# Patient Record
Sex: MALE | Race: OTHER | Employment: FULL TIME | URBAN - METROPOLITAN AREA
[De-identification: names, ages, dates, MRNs, and addresses within clinical notes are randomized per-mention and may not be internally consistent; named-entity substitution may affect disease eponyms.]

---

## 2021-12-22 ENCOUNTER — HOSPITAL ENCOUNTER (EMERGENCY)
Age: 41
Discharge: HOME | End: 2021-12-23

## 2021-12-22 VITALS
DIASTOLIC BLOOD PRESSURE: 98 MMHG | RESPIRATION RATE: 16 BRPM | HEART RATE: 88 BPM | SYSTOLIC BLOOD PRESSURE: 128 MMHG | OXYGEN SATURATION: 98 % | TEMPERATURE: 98.2 F

## 2021-12-22 DIAGNOSIS — S43.005A DISLOCATION OF LEFT SHOULDER JOINT, INITIAL ENCOUNTER: Primary | ICD-10-CM

## 2021-12-22 PROCEDURE — 23650 CLTX SHO DSLC W/MNPJ WO ANES: CPT

## 2021-12-22 PROCEDURE — 99282 EMERGENCY DEPT VISIT SF MDM: CPT

## 2021-12-22 PROCEDURE — 96372 THER/PROPH/DIAG INJ SC/IM: CPT

## 2021-12-22 RX ORDER — KETOROLAC TROMETHAMINE 30 MG/ML
30 INJECTION, SOLUTION INTRAMUSCULAR; INTRAVENOUS ONCE
Status: COMPLETED | OUTPATIENT
Start: 2021-12-22 | End: 2021-12-23

## 2021-12-22 ASSESSMENT — PAIN SCALES - GENERAL: PAINLEVEL_OUTOF10: 9

## 2021-12-23 ENCOUNTER — APPOINTMENT (OUTPATIENT)
Dept: GENERAL RADIOLOGY | Age: 41
End: 2021-12-23

## 2021-12-23 PROCEDURE — 73030 X-RAY EXAM OF SHOULDER: CPT

## 2021-12-23 PROCEDURE — 6360000002 HC RX W HCPCS: Performed by: PHYSICIAN ASSISTANT

## 2021-12-23 RX ADMIN — KETOROLAC TROMETHAMINE 30 MG: 30 INJECTION, SOLUTION INTRAMUSCULAR; INTRAVENOUS at 00:00

## 2021-12-23 ASSESSMENT — ENCOUNTER SYMPTOMS
VOMITING: 0
NAUSEA: 0

## 2021-12-23 ASSESSMENT — PAIN SCALES - GENERAL: PAINLEVEL_OUTOF10: 10

## 2021-12-23 NOTE — ED PROVIDER NOTES
[address] [facility]        Pt Name: Inocente Lazaro  MRN: [id number]  Aristeogfshirin 1980  Date of evaluation: 12/22/2021  Provider: Jailene Espana PA-C  PCP: No primary care provider on file. Note Started: 1:52 AM EST       MARTINEZ. I have evaluated this patient. My supervising physician was available for consultation. CHIEF COMPLAINT       Chief Complaint   Patient presents with    Shoulder Injury     Patient in with complaints that his left shoulder was dislocated today. States shoulder is still out of place and he is having pain        HISTORY OF PRESENT ILLNESS   (Location, Timing/Onset, Context/Setting, Quality, Duration, Modifying Factors, Severity, Associated Signs and Symptoms)  Note limiting factors. Chief Complaint: Left shoulder pain    Gibran Katrin Kimball is a 46 y.o. male who presents the emergency department today for evaluation for left shoulder pain. The patient states that he was working, and he actually denies falling on the shoulder but he states he felt a \"pop\" of the shoulder, and he feels it is out of place. Patient states he has never dislocated his shoulder in the past.  The patient states his pain is sharp, constant and scale he rated as a 10/10. He states that his pain is worse with touch and certain movements. He is reporting a \"tingling\" sensation in his arm but denies any true numbness. He has no fever or chills. No nausea or vomiting. No other complaints    Nursing Notes were all reviewed and agreed with or any disagreements were addressed in the HPI. REVIEW OF SYSTEMS    (2-9 systems for level 4, 10 or more for level 5)     Review of Systems   Constitutional: Negative for activity change, appetite change and fever. Gastrointestinal: Negative for nausea and vomiting. Musculoskeletal: Positive for arthralgias. Skin: Negative for wound. Neurological: Negative for weakness and numbness.        Positives and Pertinent negatives as per HPI. Except as noted above in the ROS, all other systems were reviewed and negative. PAST MEDICAL HISTORY   No past medical history on file. SURGICAL HISTORY   No past surgical history on file. CURRENTMEDICATIONS     There are no discharge medications for this patient. ALLERGIES     Patient has no known allergies. FAMILYHISTORY     No family history on file. SOCIAL HISTORY       Social History     Tobacco Use    Smoking status: Never Smoker    Smokeless tobacco: Never Used   Substance Use Topics    Alcohol use: Never    Drug use: Never       SCREENINGS             PHYSICAL EXAM    (up to 7 for level 4, 8 or more for level 5)     ED Triage Vitals [12/22/21 2324]   BP Temp Temp Source Pulse Resp SpO2 Height Weight   (!) 128/98 98.2 °F (36.8 °C) Oral 88 16 98 % -- --       Physical Exam  Vitals and nursing note reviewed. Constitutional:       Appearance: He is well-developed. He is not diaphoretic. HENT:      Head: Normocephalic and atraumatic. Right Ear: External ear normal.      Left Ear: External ear normal.      Nose: Nose normal.   Eyes:      General:         Right eye: No discharge. Left eye: No discharge. Neck:      Trachea: No tracheal deviation. Cardiovascular:      Pulses: Normal pulses. Pulmonary:      Effort: Pulmonary effort is normal. No respiratory distress. Musculoskeletal:         General: Normal range of motion. Cervical back: Normal range of motion and neck supple. Comments: There is loss of contour of the shoulder on the left. Limited range of motion secondary to pain. Radial pulses 2+. Normal sensation light touch. Neurovascular intact. Wiggles all fingers. Skin:     General: Skin is warm and dry. Neurological:      Mental Status: He is alert and oriented to person, place, and time.    Psychiatric:         Behavior: Behavior normal.         DIAGNOSTIC RESULTS   LABS:    Labs Reviewed - No data to display    When ordered only abnormal lab results are displayed. All other labs were within normal range or not returned as of this dictation. EKG: When ordered, EKG's are interpreted by the Emergency Department Physician in the absence of a cardiologist.  Please see their note for interpretation of EKG. RADIOLOGY:   Non-plain film images such as CT, Ultrasound and MRI are read by the radiologist. Plain radiographic images are visualized and preliminarily interpreted by the ED Provider with the below findings:        Interpretation per the Radiologist below, if available at the time of this note:    XR SHOULDER LEFT (MIN 2 VIEWS)   Final Result   The left glenohumeral joint is now in anatomic alignment. XR SHOULDER LEFT (MIN 2 VIEWS)   Final Result   Acute anterior inferior dislocation of the left glenohumeral joint. XR SHOULDER LEFT (MIN 2 VIEWS)    Result Date: 12/23/2021  EXAMINATION: THREE XRAY VIEWS OF THE LEFT SHOULDER 12/23/2021 12:41 am COMPARISON: None. HISTORY: ORDERING SYSTEM PROVIDED HISTORY: post redcution TECHNOLOGIST PROVIDED HISTORY: Reason for exam:->post redcution Reason for Exam: post reduction FINDINGS: The left glenohumeral joint is now in anatomic alignment. No acute fracture is visualized. The left glenohumeral joint is now in anatomic alignment. XR SHOULDER LEFT (MIN 2 VIEWS)    Result Date: 12/23/2021  EXAMINATION: THREE XRAY VIEWS OF THE LEFT SHOULDER 12/23/2021 12:19 am COMPARISON: None. HISTORY: ORDERING SYSTEM PROVIDED HISTORY: pain TECHNOLOGIST PROVIDED HISTORY: Reason for exam:->pain Reason for Exam: L/shoulder pain FINDINGS: Acute anterior inferior dislocation of the left glenohumeral joint. The left [facility] joint is in anatomic alignment. No acute fracture is visualized. The visualized lung field is clear. Acute anterior inferior dislocation of the left glenohumeral joint.            PROCEDURES   Unless otherwise noted below, none     Ortho Injury    Date/Time: 12/23/2021 1:54 AM  Performed by: Brent Sung PA-C  Authorized by: Brent Sung PA-C   Consent: Verbal consent obtained. Consent given by: patient  Patient understanding: patient states understanding of the procedure being performed  Patient consent: the patient's understanding of the procedure matches consent given  Required items: required blood products, implants, devices, and special equipment available  Patient identity confirmed: verbally with patient  Injury location: shoulder  Location details: left shoulder  Injury type: dislocation  Dislocation type: anterior  Hill-Sachs deformity: no  Chronicity: new  Pre-procedure neurovascular assessment: neurovascularly intact  Pre-procedure range of motion: reduced    Anesthesia:  Local anesthesia used: no    Sedation:  Patient sedated: no    Manipulation performed: yes  Reduction method: traction and counter traction  Reduction successful: yes  X-ray confirmed reduction: yes  Immobilization: sling  Post-procedure neurovascular assessment: post-procedure neurovascularly intact  Patient tolerance: patient tolerated the procedure well with no immediate complications          CRITICAL CARE TIME   N/A    CONSULTS:  None      EMERGENCY DEPARTMENT COURSE and DIFFERENTIAL DIAGNOSIS/MDM:   Vitals:    Vitals:    12/22/21 2324   BP: (!) 128/98   Pulse: 88   Resp: 16   Temp: 98.2 °F (36.8 °C)   TempSrc: Oral   SpO2: 98%       Patient was given the following medications:  Medications   ketorolac (TORADOL) injection 30 mg (30 mg IntraMUSCular Given 12/23/21 0000)           Briefly, this is a 72-year-old male who presents emergency department today for evaluation for left shoulder pain. Patient states that he is unsure exactly what happened, he states that he was at work today, he states that he felt his shoulder pop out of place.   The patient states that he feels that his shoulder is dislocated although he states that this is never happened to him in the past.    On examination, the patient has loss of the anterior shoulder. He is neurovascular intact. X-ray does show an anterior acute dislocation. The area was manipulated by myself, we did not use any sedation medications, patient was medicated with Toradol, tolerated the procedure quite well. X-rays confirmed reduction    Patient will be placed in sling and swath, he was referred to orthopedics for follow-up within the next week. He is to return to the ED for any new or worsening symptoms, the patient was understanding scribbled plan. Stable for discharge. No results found for this visit on 12/22/21. I estimate there is LOW risk for COMPARTMENT SYNDROME, DEEP VENOUS THROMBOSIS, SEPTIC ARTHRITIS, TENDON OR NEUROVASCULAR INJURY, thus I consider the discharge disposition reasonable. Gibran Martinez and I have discussed the diagnosis and risks, and we agree with discharging home to follow-up with their primary doctor or the referral orthopedist. We also discussed returning to the Emergency Department immediately if new or worsening symptoms occur. We have discussed the symptoms which are most concerning (e.g., changing or worsening pain, numbness, weakness) that necessitate immediate return. Final Impression    1. Dislocation of left shoulder joint, initial encounter        Blood pressure (!) 128/98, pulse 88, temperature 98.2 °F (36.8 °C), temperature source Oral, resp. rate 16, SpO2 98 %. FINAL IMPRESSION      1. Dislocation of left shoulder joint, initial encounter          DISPOSITION/PLAN   DISPOSITION Decision To Discharge 12/23/2021 01:04:28 AM      PATIENT REFERRED TO:  Chapito Otero MD  [address] [place] [address].   [place] [id number]    Schedule an appointment as soon as possible for a visit in 1 week      [facility] Emergency Department  [address] [place] [address]  [phone]    As needed, If symptoms worsen      DISCHARGE MEDICATIONS:  There are no discharge medications for this patient. DISCONTINUED MEDICATIONS:  There are no discharge medications for this patient.              (Please note that portions of this note were completed with a voice recognition program.  Efforts were made to edit the dictations but occasionally words are mis-transcribed.)    Kinjal Castillo PA-C (electronically signed)            Kinjal Castillo PA-C  12/23/21 0155

## 2021-12-23 NOTE — ED NOTES
Sling placed on patient Pt Discharged in stable condition, VSS, no signs of distress, discharge instructions and meds reviewed. Pt verbalizes understanding and states no further questions or concerns unaddressed.        Ismael Taylor RN  12/23/21 [id number]

## 2022-07-04 ENCOUNTER — APPOINTMENT (OUTPATIENT)
Dept: CT IMAGING | Age: 42
End: 2022-07-04

## 2022-07-04 ENCOUNTER — APPOINTMENT (OUTPATIENT)
Dept: GENERAL RADIOLOGY | Age: 42
End: 2022-07-04

## 2022-07-04 ENCOUNTER — HOSPITAL ENCOUNTER (EMERGENCY)
Age: 42
Discharge: HOME | End: 2022-07-04
Attending: EMERGENCY MEDICINE

## 2022-07-04 VITALS
OXYGEN SATURATION: 99 % | DIASTOLIC BLOOD PRESSURE: 71 MMHG | HEART RATE: 86 BPM | SYSTOLIC BLOOD PRESSURE: 106 MMHG | RESPIRATION RATE: 13 BRPM | TEMPERATURE: 99.1 F
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
## 2022-07-04 DIAGNOSIS — S43.015A ANTERIOR DISLOCATION OF LEFT SHOULDER, INITIAL ENCOUNTER: ICD-10-CM

## 2022-07-04 PROCEDURE — 99285 EMERGENCY DEPT VISIT HI MDM: CPT

## 2022-07-04 PROCEDURE — 2580000003 HC RX 258: Performed by: EMERGENCY MEDICINE

## 2022-07-04 PROCEDURE — 99152 MOD SED SAME PHYS/QHP 5/>YRS: CPT

## 2022-07-04 PROCEDURE — 96361 HYDRATE IV INFUSION ADD-ON: CPT

## 2022-07-04 PROCEDURE — 72125 CT NECK SPINE W/O DYE: CPT

## 2022-07-04 PROCEDURE — 96375 TX/PRO/DX INJ NEW DRUG ADDON: CPT

## 2022-07-04 PROCEDURE — 73030 X-RAY EXAM OF SHOULDER: CPT

## 2022-07-04 PROCEDURE — 71045 X-RAY EXAM CHEST 1 VIEW: CPT

## 2022-07-04 PROCEDURE — 6360000002 HC RX W HCPCS: Performed by: EMERGENCY MEDICINE

## 2022-07-04 PROCEDURE — 96374 THER/PROPH/DIAG INJ IV PUSH: CPT

## 2022-07-04 PROCEDURE — 23650 CLTX SHO DSLC W/MNPJ WO ANES: CPT

## 2022-07-04 PROCEDURE — 70450 CT HEAD/BRAIN W/O DYE: CPT

## 2022-07-04 RX ORDER — ONDANSETRON 2 MG/ML
4 INJECTION INTRAMUSCULAR; INTRAVENOUS
Status: DISCONTINUED | OUTPATIENT
Start: 2022-07-04 | End: 2022-07-04 | Stop reason: HOSPADM

## 2022-07-04 RX ORDER — FENTANYL CITRATE 50 UG/ML
50 INJECTION, SOLUTION INTRAMUSCULAR; INTRAVENOUS ONCE
Status: COMPLETED | OUTPATIENT
Start: 2022-07-04 | End: 2022-07-04

## 2022-07-04 RX ORDER — IBUPROFEN 200 MG
600 TABLET ORAL EVERY 8 HOURS PRN
Qty: 60 TABLET | Refills: 0 | Status: SHIPPED | OUTPATIENT
Start: 2022-07-04

## 2022-07-04 RX ORDER — SODIUM CHLORIDE 9 MG/ML
INJECTION, SOLUTION INTRAVENOUS CONTINUOUS
Status: DISCONTINUED | OUTPATIENT
Start: 2022-07-04 | End: 2022-07-04 | Stop reason: HOSPADM

## 2022-07-04 RX ORDER — PROPOFOL 10 MG/ML
INJECTION, EMULSION INTRAVENOUS
Status: DISCONTINUED
Start: 2022-07-04 | End: 2022-07-04 | Stop reason: HOSPADM

## 2022-07-04 RX ORDER — CYCLOBENZAPRINE HCL 10 MG
10 TABLET ORAL 3 TIMES DAILY PRN
Qty: 20 TABLET | Refills: 0 | Status: SHIPPED | OUTPATIENT
Start: 2022-07-04 | End: 2022-07-14

## 2022-07-04 RX ADMIN — SODIUM CHLORIDE: 9 INJECTION, SOLUTION INTRAVENOUS at 07:03

## 2022-07-04 RX ADMIN — ONDANSETRON 4 MG: 2 INJECTION INTRAMUSCULAR; INTRAVENOUS at 07:00

## 2022-07-04 RX ADMIN — FENTANYL CITRATE 50 MCG: 50 INJECTION, SOLUTION INTRAMUSCULAR; INTRAVENOUS at 07:00

## 2022-07-04 ASSESSMENT — PAIN - FUNCTIONAL ASSESSMENT: PAIN_FUNCTIONAL_ASSESSMENT: 0-10

## 2022-07-04 ASSESSMENT — PAIN SCALES - GENERAL
PAINLEVEL_OUTOF10: 10
PAINLEVEL_OUTOF10: 10

## 2022-07-04 NOTE — ED NOTES
Patient to CT at this time     Delayne JUAN ANTONIO Mcconnell  07/04/22 7539
Pt back from CT at this time     Genesis Díaz, JUAN ANTONIO  07/04/22 9479
Safety supplies at bedside pre sedation, including suction equipment, bag-mask with valve, oxygen equipment/non-rebreather masks, CO2 monitor on patient.      Flonnie Kussmaul, RN  07/04/22 7002
Detail Level: Simple
1 = assistive equipment
0 = independent

## 2022-07-10 NOTE — ED PROVIDER NOTES
[facility]) Emergency [address] [facility]    Tiara Ace MD, am the primary clinician of record. CHIEF COMPLAINT  Chief Complaint   Patient presents with    Shoulder Pain     Patient triaged using , brought in by [facility]. Per EMS patient was found on the side of the road. Patient states he fell and dislocated his L shoulder also states he has been drinking tonight. Denies LOC or head trauma. Patient states this happens frequently. HISTORY OF PRESENT ILLNESS  Gibran Greg Dai is a 46 y.o. male who presents to the ED complaining of drinking alcohol tonight and sustaining a fall. He suspects he dislocated his left shoulder. This has happened to him previously. Occurred roughly 2 hours prior to arrival today. This happened on his doorstep and then he went outside and was found and brought here. Denies any head injury, neck pain, or other injuries. R hand dominant.  phone was used ([place] to [place]) in order to obtain history, assist with physical exam, explain results and medical decision making, plan for treatment/follow-up, and answer all questions and concerns.  #[id number] was used. No other complaints, modifying factors or associated symptoms. Nursing notes reviewed. History reviewed. No pertinent past medical history. History reviewed. No pertinent surgical history. History reviewed. No pertinent family history.   Social History     Socioeconomic History    Marital status: Single     Spouse name: Not on file    Number of children: Not on file    Years of education: Not on file    Highest education level: Not on file   Occupational History    Not on file   Tobacco Use    Smoking status: Never Smoker    Smokeless tobacco: Never Used   Substance and Sexual Activity    Alcohol use: Never    Drug use: Never    Sexual activity: Not on file   Other Topics Concern    Not on file   Social History Narrative    Not on file     Social Continue BRAT (broth, bananas, rice, apples, and toast or crackers) for the next 3-5 days  Continue small meals (5 meals) instead of 3 large meals for the next 3-5 days  Follow-up with PCP in 3-5 days  Report to the emergency department if symptoms recur or worsen  Determinants of Health     Financial Resource Strain:     Difficulty of Paying Living Expenses: Not on file   Food Insecurity:     Worried About Running Out of Food in the Last Year: Not on file    Vinnie of Food in the Last Year: Not on file   Transportation Needs:     Lack of Transportation (Medical): Not on file    Lack of Transportation (Non-Medical):  Not on file   Physical Activity:     Days of Exercise per Week: Not on file    Minutes of Exercise per Session: Not on file   Stress:     Feeling of Stress : Not on file   Social Connections:     Frequency of Communication with Friends and Family: Not on file    Frequency of Social Gatherings with Friends and Family: Not on file    Attends Rastafarian Services: Not on file    Active Member of [address] [place] [address] [employer] or Organizations: Not on file    Attends Club or Organization Meetings: Not on file    Marital Status: Not on file   Intimate Partner Violence:     Fear of Current or Ex-Partner: Not on file    Emotionally Abused: Not on file    Physically Abused: Not on file    Sexually Abused: Not on file   Housing Stability:     Unable to Pay for Housing in the Last Year: Not on file    Number of [place] in the Last Year: Not on file    Unstable Housing in the Last Year: Not on file     Current Facility-Administered Medications   Medication Dose Route Frequency Provider Last Rate Last Admin    propofol 200 MG/20ML injection             ondansetron (ZOFRAN) injection 4 mg  4 mg IntraVENous Q1H PRN Catheryn Collet, MD   4 mg at 07/04/22 0700    0.9 % sodium chloride infusion   IntraVENous Continuous Catheryn Collet,  mL/hr at 07/04/22 0703 New Bag at 07/04/22 0703     Current Outpatient Medications   Medication Sig Dispense Refill    ibuprofen (ADVIL) 200 MG tablet Take 3 tablets by mouth every 8 hours as needed for Pain 60 tablet 0    cyclobenzaprine (FLEXERIL) 10 MG tablet Take 1 tablet by mouth 3 times daily as needed for Muscle spasms (CAUTION: Can cause dizziness, don't drive while taking.) 20 tablet 0     No Known Allergies    REVIEW OF SYSTEMS  6 systems reviewed, pertinent positives per HPI otherwise noted to be negative    PHYSICAL EXAM   /71   Pulse 86   Temp 99.1 °F (37.3 °C) (Oral)   Resp 13   SpO2 99%    GENERAL APPEARANCE: Awake and alert. Cooperative. No acute distress. HEAD: Normocephalic. Atraumatic. EYES: PERRL. EOM's grossly intact. ENT: Mucous membranes are moist.   BACK:      Cervical: no tenderness noted, no midline tenderness, no paraspinous spasm      Thoracic: no tenderness noted, no midline tenderness, no paraspinous spasm      Lumbar: no tenderness noted, no midline tenderness, no paraspinous spasm;  NECK: Supple. Normal ROM. CHEST: Equal symmetric chest rise. RRR, no chest wall ttp  LUNGS: Breathing is unlabored. Speaking comfortably in full sentences. CTAB  ABDOMEN: Nondistended, nontender  MUSCULOSKELETAL:  RUE: No tenderness. 2+ radial pulse. Brisk cap refill x5 digits. Sensation and motor function fully intact in the radial, ulnar, and median nerve distribution. Full range of motion of all major joints. Cardinal movements of hand fully intact. No erythema, bruising, or lacerations. Comparments are soft. LUE: Shoulder with obvious anterior dislocation. No ttp at the elbow forearm wrist or hand. Tender at the shoulder joint. 2+ radial pulse. Brisk cap refill x5 digits. Sensation and motor function fully intact in the radial, ulnar, and median nerve distribution. Full range of motion of all major joints except the shoulder as limited by pain. Cardinal movements of hand fully intact. No erythema, bruising, or lacerations. Comparments are soft. RLE: No tenderness. 2+ DP and PT. Sensation and motor function fully intact. Full range of motion of all major joints. No erythema, bruising, or lacerations. Compartments are soft. 2+ patellar reflex. Achilles nontender and intact.   Able to bear weight. No joint swelling or effusions are noted. LLE: No tenderness. 2+ DP and PT. Sensation and motor function fully intact. Full range of motion of all major joints. No erythema, bruising, or lacerations. Compartments are soft. 2+ patellar reflex. Achilles nontender and intact. Able to bear weight. No joint swelling or effusions are noted. SKIN: Warm and dry. No acute rashes. NEUROLOGICAL: Alert and oriented. Strength is 5/5 in all extremities and sensation is intact. RADIOLOGY    CT HEAD WO CONTRAST    Result Date: 7/4/2022  EXAMINATION: CT OF THE CERVICAL SPINE WITHOUT CONTRAST; CT OF THE HEAD WITHOUT CONTRAST 7/4/2022 8:48 am TECHNIQUE: CT of the cervical spine was performed without the administration of intravenous contrast. Multiplanar reformatted images are provided for review. Automated exposure control, iterative reconstruction, and/or weight based adjustment of the mA/kV was utilized to reduce the radiation dose to as low as reasonably achievable.; CT of the head was performed without the administration of intravenous contrast. Automated exposure control, iterative reconstruction, and/or weight based adjustment of the mA/kV was utilized to reduce the radiation dose to as low as reasonably achievable. COMPARISON: None HISTORY: ORDERING SYSTEM PROVIDED HISTORY: trauma TECHNOLOGIST PROVIDED HISTORY: Reason for exam:->trauma Decision Support Exception - unselect if not a suspected or confirmed emergency medical condition->Emergency Medical Condition (MA) Reason for Exam: trauma FINDINGS: BONES/ALIGNMENT: There is normal alignment of the cervical spine. No fracture or osseous destructive lesion. DEGENERATIVE CHANGES: There is no significant degenerative disc disease or central spinal canal narrowing. SOFT TISSUES: No paraspinal masses. The lung apices are clear. CT head:  The cerebral and cerebellar parenchyma demonstrate normal volume without areas of hemorrhage, mass, or midline shift.  No abnormal extra-axial fluid collections. The ventricles are proportional to the cerebral sulci. Gray-white differentiation is maintained without evidence of an acute infarct. ORBITS: The orbits are unremarkable. SINUSES: There is scattered trace paranasal sinus disease. The mastoid air cells are well aerated. SOFT TISSUE/BONE: The calvarium is intact. No appreciable scalp soft tissue swelling. 1. No evidence of acute fracture in the cervical spine. 2. No acute intracranial abnormality. CT CERVICAL SPINE WO CONTRAST    Result Date: 7/4/2022  EXAMINATION: CT OF THE CERVICAL SPINE WITHOUT CONTRAST; CT OF THE HEAD WITHOUT CONTRAST 7/4/2022 8:48 am TECHNIQUE: CT of the cervical spine was performed without the administration of intravenous contrast. Multiplanar reformatted images are provided for review. Automated exposure control, iterative reconstruction, and/or weight based adjustment of the mA/kV was utilized to reduce the radiation dose to as low as reasonably achievable.; CT of the head was performed without the administration of intravenous contrast. Automated exposure control, iterative reconstruction, and/or weight based adjustment of the mA/kV was utilized to reduce the radiation dose to as low as reasonably achievable. COMPARISON: None HISTORY: ORDERING SYSTEM PROVIDED HISTORY: trauma TECHNOLOGIST PROVIDED HISTORY: Reason for exam:->trauma Decision Support Exception - unselect if not a suspected or confirmed emergency medical condition->Emergency Medical Condition (MA) Reason for Exam: trauma FINDINGS: BONES/ALIGNMENT: There is normal alignment of the cervical spine. No fracture or osseous destructive lesion. DEGENERATIVE CHANGES: There is no significant degenerative disc disease or central spinal canal narrowing. SOFT TISSUES: No paraspinal masses. The lung apices are clear. CT head:  The cerebral and cerebellar parenchyma demonstrate normal volume without areas of hemorrhage, mass, or midline shift. No abnormal extra-axial fluid collections. The ventricles are proportional to the cerebral sulci. Gray-white differentiation is maintained without evidence of an acute infarct. ORBITS: The orbits are unremarkable. SINUSES: There is scattered trace paranasal sinus disease. The mastoid air cells are well aerated. SOFT TISSUE/BONE: The calvarium is intact. No appreciable scalp soft tissue swelling. 1. No evidence of acute fracture in the cervical spine. 2. No acute intracranial abnormality. XR SHOULDER LEFT (MIN 2 VIEWS)    Result Date: 7/4/2022  EXAMINATION: THREE XRAY VIEWS OF THE LEFT SHOULDER 7/4/2022 8:15 am COMPARISON: 07/04/2022 at 7:17 a.m. HISTORY: Post reduction view after shoulder dislocation FINDINGS: Interval realignment of the humeral head; the glenohumeral joint is congruent. Mild widening of the acromioclavicular joint. No acute fracture. The soft tissues are unremarkable. Left glenohumeral joint is now in anatomic alignment. Mild widening of the acromioclavicular joint. No acute fracture. XR SHOULDER LEFT (MIN 2 VIEWS)    Result Date: 7/4/2022  EXAMINATION: THREE XRAY VIEWS OF THE LEFT SHOULDER 7/4/2022 6:55 am COMPARISON: 12/23/2021 HISTORY: ORDERING SYSTEM PROVIDED HISTORY: suspect dislocation TECHNOLOGIST PROVIDED HISTORY: Reason for exam:->suspect dislocation Reason for Exam: Suspected dislocation, Shoulder Pain (Patient triaged using , brought in by [facility]. Per EMS patient was found on the side of the road. Patient states he fell and dislocated his L shoulder also states he has been drinking tonight. Denies LOC or head trauma. Patient states this happens frequently. ). FINDINGS: Anterior left shoulder dislocation is noted. No fracture is evident. Bone density is normal.  Surrounding osseous and soft tissue structures are unremarkable. Anterior left shoulder dislocation.      XR CHEST PORTABLE    Result Date: 7/4/2022  EXAMINATION: ONE XRAY VIEW OF THE CHEST 7/4/2022 6:55 am COMPARISON: None. HISTORY: ORDERING SYSTEM PROVIDED HISTORY: trauma TECHNOLOGIST PROVIDED HISTORY: Reason for exam:->trauma Reason for Exam: Trauma, Shoulder Pain (Patient triaged using , brought in by [facility]. Per EMS patient was found on the side of the road. Patient states he fell and dislocated his L shoulder also states he has been drinking tonight. Denies LOC or head trauma. Patient states this happens frequently. ). FINDINGS: Heart size and pulmonary vasculature are normal.  The lungs are clear and normally expanded. Surrounding osseous and soft tissue structures are unremarkable. Normal examination. ED COURSE/MDM  Differential diagnosis considerations included: intracranial injury, cervical spine injury, chest/abdominal organ injury, extremity injury, abrasion/laceration, contusion, fracture, sprain/strain, dislocation    The patient's ED course was notable for mechanical fall while intoxicated with alcohol. Although his no head or neck injury given his intoxication we did perform a CT of the head and C-spine that were negative. His main complaint is left shoulder pain with an obvious anterior dislocation confirmed with x-ray without fracture. This was reduced after conscious sedation per the procedure note below. He is clinically conversant and ambulatory independently. He was placed in a sling and swath and referred to orthopedics for follow-up with prescriptions of NSAIDs and muscle relaxers. Joint Reduction Procedure Note    Indication: Joint dislocation    Consent: The patient was counseled regarding the procedure, it's indications, risks, potential complications and alternatives and any questions were answered. Consent was obtained. Procedure: The pre-reduction exam showed distal perfusion & neurologic function to be normal. The patient was placed in the sitting position.  Anesthesia/pain control was obtained using conscious sedation -SEE CONSCIOUS SEDATION NOTE FOR DETAILS. Reduction of the left shoulder was performed by downward traction, external rotation, then abduction, followed by axillary manipulation and counter traction. Post reduction films were obtained and revealed satisfactory reduction. A post-reduction exam revealed distal perfusion & neurologic function to be normal. The affected area was immobilized with a sling and swath. The patient tolerated the procedure well. Complications: None          Conscious Sedation Procedure Note    Indication: shoulder dislocation    Consent: I have discussed with the patient and/or the patient representative the indication, alternatives, and the possible risks and/or complications of the planned procedure and the anesthesia methods. The patient and/or patient representative appear to understand and agree to proceed. Physician Involvement: The attending physician was present and supervising this procedure. Pre-Sedation Documentation and Exam: I have personally completed a history, physical exam & review of systems for this patient (see notes). Lungs: clear to auscultation bilaterally, Cardiovascular: regular rate and rhythm. Airway Assessment: normal, Mallampati Class I - (soft palate, fauces, uvula & anterior/posterior tonsillar pillars are visible)    Prior History of Anesthesia Complications: none    ASA Classification: Class 1 - A normal healthy patient    Sedation/ Anesthesia Plan: intravenous sedation    Medications Used: propofol intravenously, total 100mg used in single bolus    Monitoring and Safety: The patient was placed on a cardiac monitor and vital signs, pulse oximetry and level of consciousness were continuously evaluated throughout the procedure. The patient was closely monitored until recovery from the medications was complete and the patient had returned to baseline status.  Respiratory therapy was on standby at all times during the procedure. (The following sections must be completed)  Post-Sedation Vital Signs: Vital signs were reviewed and were stable after the procedure (see flow sheet for vitals)            Post-Sedation Exam: Lungs: clear to auscultation bilaterally and Cardiovascular: regular rate and rhythm           Complications: none          Patient was given scripts for the following medications. I counseled patient how to take these medications. New Prescriptions    CYCLOBENZAPRINE (FLEXERIL) 10 MG TABLET    Take 1 tablet by mouth 3 times daily as needed for Muscle spasms (CAUTION: Can cause dizziness, don't drive while taking.)    IBUPROFEN (ADVIL) 200 MG TABLET    Take 3 tablets by mouth every 8 hours as needed for Pain         CLINICAL IMPRESSION  1. Fall on same level from slipping, tripping or stumbling, initial encounter    2. Anterior dislocation of left shoulder, initial encounter        Blood pressure 106/71, pulse 86, temperature 99.1 °F (37.3 °C), temperature source Oral, resp. rate 13, SpO2 99 %. DISPOSITION    I have discussed the findings of today's workup with the patient and addressed the patient's questions and concerns. Important warning signs as well as new or worsening symptoms which would necessitate immediate return to the ED were discussed. The plan is to discharge from the ED at this time, and the patient is in stable condition. The patient acknowledged understanding is agreeable with this plan. Follow-up with: Jesus Alberto Juares MD  [address] [place] [address] Street  [address] [place] [address],[address] [facility]    Schedule an appointment as soon as possible for a visit in 1 week  For symptom re-evaluation    [facility] Emergency Department  [address] [facility]  [phone]  Go to   If symptoms worsen      This chart was created using Dragon dictation software. Efforts were made by me to ensure accuracy, however some errors may be present due to limitations of this technology. Sandee Mason MD  07/04/22 4409

## 2025-03-25 ENCOUNTER — APPOINTMENT (OUTPATIENT)
Dept: CT IMAGING | Age: 45
End: 2025-03-25

## 2025-03-25 ENCOUNTER — HOSPITAL ENCOUNTER (EMERGENCY)
Age: 45
Discharge: HOME | End: 2025-03-25

## 2025-03-25 VITALS
SYSTOLIC BLOOD PRESSURE: 148 MMHG | OXYGEN SATURATION: 96 % | HEIGHT: 68 IN | RESPIRATION RATE: 14 BRPM | TEMPERATURE: 98.3 F | WEIGHT: 187 LBS | BODY MASS INDEX: 28.34 KG/M2 | DIASTOLIC BLOOD PRESSURE: 87 MMHG | HEART RATE: 90 BPM

## 2025-03-25 DIAGNOSIS — Z23 NEED FOR TETANUS BOOSTER: ICD-10-CM

## 2025-03-25 DIAGNOSIS — S01.81XA LACERATION OF FOREHEAD, INITIAL ENCOUNTER: Primary | ICD-10-CM

## 2025-03-25 PROCEDURE — 6360000002 HC RX W HCPCS: Performed by: PHYSICIAN ASSISTANT

## 2025-03-25 PROCEDURE — 70450 CT HEAD/BRAIN W/O DYE: CPT

## 2025-03-25 PROCEDURE — 99284 EMERGENCY DEPT VISIT MOD MDM: CPT

## 2025-03-25 PROCEDURE — 90714 TD VACC NO PRESV 7 YRS+ IM: CPT | Performed by: PHYSICIAN ASSISTANT

## 2025-03-25 PROCEDURE — 90471 IMMUNIZATION ADMIN: CPT | Performed by: PHYSICIAN ASSISTANT

## 2025-03-25 RX ORDER — CEPHALEXIN 500 MG/1
500 CAPSULE ORAL 2 TIMES DAILY
Qty: 10 CAPSULE | Refills: 0 | Status: SHIPPED | OUTPATIENT
Start: 2025-03-25 | End: 2025-03-30

## 2025-03-25 RX ADMIN — CLOSTRIDIUM TETANI TOXOID ANTIGEN (FORMALDEHYDE INACTIVATED) AND CORYNEBACTERIUM DIPHTHERIAE TOXOID ANTIGEN (FORMALDEHYDE INACTIVATED) 0.5 ML: 5; 2 INJECTION, SUSPENSION INTRAMUSCULAR at 13:00

## 2025-03-25 ASSESSMENT — ENCOUNTER SYMPTOMS
ABDOMINAL PAIN: 0
RHINORRHEA: 0
DIARRHEA: 0
COUGH: 0
SHORTNESS OF BREATH: 0
NAUSEA: 0
VOMITING: 0
WHEEZING: 0

## 2025-03-25 ASSESSMENT — PAIN - FUNCTIONAL ASSESSMENT
PAIN_FUNCTIONAL_ASSESSMENT: 0-10
PAIN_FUNCTIONAL_ASSESSMENT: NONE - DENIES PAIN

## 2025-03-25 ASSESSMENT — LIFESTYLE VARIABLES: HOW OFTEN DO YOU HAVE A DRINK CONTAINING ALCOHOL: 2-4 TIMES A MONTH

## 2025-03-25 ASSESSMENT — PAIN DESCRIPTION - LOCATION: LOCATION: HEAD

## 2025-03-25 ASSESSMENT — PAIN SCALES - GENERAL: PAINLEVEL_OUTOF10: 4

## 2025-03-25 ASSESSMENT — PAIN DESCRIPTION - PAIN TYPE: TYPE: ACUTE PAIN

## 2025-03-25 NOTE — ED PROVIDER NOTES
[facility] EMERGENCY DEPARTMENT  EMERGENCY DEPARTMENT ENCOUNTER        Pt Name: Gibran Martinez  MRN: [id number]  Birthdate 1980  Date of evaluation: 3/25/2025  Provider: Mita Bo PA-C  PCP: No primary care provider on file.  Note Started: 12:10 PM EDT 3/25/25      MARTINEZ. I have evaluated this patient.        CHIEF COMPLAINT       Chief Complaint   Patient presents with    Laceration     Pt was doing cocaine and was drunk yesterday, hit his head on a ladder and has a laceration with minor bleeding to his forehead, no loc.        HISTORY OF PRESENT ILLNESS: 1 or more Elements     History From: patient  Limitations to history : Language Iranian    Gibran Martinez is a 44 y.o. male who presents for evaluation of a forehead laceration that occurred last night.  Patient states that he had been drinking and fell down the last 2 steps of the stairs.  States that he hit his head but denies loss of consciousness.  He is not anticoagulated.  Bleeding is controlled.  Tetanus is unknown.  Denies significant headaches, dizziness/lightheadedness, weakness or visual disturbances.  No neck pain.  No other injuries or complaints at this time.    Nursing Notes were all reviewed and agreed with or any disagreements were addressed in the HPI.    REVIEW OF SYSTEMS :      Review of Systems   Constitutional:  Negative for appetite change, chills and fever.   HENT:  Negative for congestion and rhinorrhea.    Respiratory:  Negative for cough, shortness of breath and wheezing.    Cardiovascular:  Negative for chest pain.   Gastrointestinal:  Negative for abdominal pain, diarrhea, nausea and vomiting.   Genitourinary:  Negative for difficulty urinating, dysuria and hematuria.   Musculoskeletal:  Negative for neck pain and neck stiffness.   Skin:  Positive for wound. Negative for rash.   Neurological:  Positive for headaches. Negative for syncope.       Positives and Pertinent negatives as per HPI.     SURGICAL HISTORY   No past